# Patient Record
Sex: FEMALE | URBAN - METROPOLITAN AREA
[De-identification: names, ages, dates, MRNs, and addresses within clinical notes are randomized per-mention and may not be internally consistent; named-entity substitution may affect disease eponyms.]

---

## 2018-02-14 ENCOUNTER — EMERGENCY (EMERGENCY)
Facility: HOSPITAL | Age: 28
LOS: 0 days | Discharge: LEFT BEFORE DISCHARGE | End: 2018-02-15

## 2018-02-14 VITALS
OXYGEN SATURATION: 98 % | SYSTOLIC BLOOD PRESSURE: 155 MMHG | DIASTOLIC BLOOD PRESSURE: 73 MMHG | WEIGHT: 110.01 LBS | RESPIRATION RATE: 20 BRPM | HEART RATE: 91 BPM | TEMPERATURE: 97 F

## 2018-02-14 NOTE — ED ADULT TRIAGE NOTE - CHIEF COMPLAINT QUOTE
pt states she took zithromax 1 gm and another antibiotic just PTA and now has rash and hives, denies difficulty breathing

## 2019-09-09 ENCOUNTER — RESULT REVIEW (OUTPATIENT)
Age: 29
End: 2019-09-09

## 2020-09-21 ENCOUNTER — RESULT REVIEW (OUTPATIENT)
Age: 30
End: 2020-09-21

## 2021-09-28 ENCOUNTER — RESULT REVIEW (OUTPATIENT)
Age: 31
End: 2021-09-28

## 2022-07-14 ENCOUNTER — APPOINTMENT (OUTPATIENT)
Dept: ORTHOPEDIC SURGERY | Facility: CLINIC | Age: 32
End: 2022-07-14

## 2022-07-14 DIAGNOSIS — M25.562 PAIN IN LEFT KNEE: ICD-10-CM

## 2022-07-14 DIAGNOSIS — M25.552 PAIN IN LEFT HIP: ICD-10-CM

## 2022-07-14 PROBLEM — Z00.00 ENCOUNTER FOR PREVENTIVE HEALTH EXAMINATION: Status: ACTIVE | Noted: 2022-07-14

## 2022-07-14 PROCEDURE — 99204 OFFICE O/P NEW MOD 45 MIN: CPT

## 2022-07-14 PROCEDURE — 73502 X-RAY EXAM HIP UNI 2-3 VIEWS: CPT | Mod: LT

## 2022-07-14 PROCEDURE — 73562 X-RAY EXAM OF KNEE 3: CPT | Mod: 50

## 2022-07-14 RX ORDER — DICLOFENAC SODIUM 75 MG/1
75 TABLET, DELAYED RELEASE ORAL
Qty: 60 | Refills: 2 | Status: ACTIVE | COMMUNITY
Start: 2022-07-14 | End: 1900-01-01

## 2022-07-14 NOTE — HISTORY OF PRESENT ILLNESS
[de-identified] : This is a 31-year-old patient here for evaluation.  She has a left hip and left knee pain.  She denies any acute trauma.  She is very active and exercises with pain.  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  I would like to obtain an MRI of the left hip as she might have a labral tear.  I also sent a prescription for diclofenac.  She will start a course of physical therapy for her hip core strengthening for the hip joint as well as patellofemoral strengthening for her patellofemoral syndrome and knee.  She will follow-up and see me after the MRI

## 2024-11-21 NOTE — ED ADULT TRIAGE NOTE - AS TEMP SITE
Patient has bad cough again that won't go away. Her  says that Dr. Ryder has prescribed some cough medicine and steroid for her before to help with this. They are asking if he will prescribe both of these again and send to [facility] in [place].   oral